# Patient Record
Sex: MALE | Race: WHITE
[De-identification: names, ages, dates, MRNs, and addresses within clinical notes are randomized per-mention and may not be internally consistent; named-entity substitution may affect disease eponyms.]

---

## 2019-09-06 ENCOUNTER — HOSPITAL ENCOUNTER (OUTPATIENT)
Dept: HOSPITAL 76 - SDS | Age: 34
Discharge: HOME | End: 2019-09-06
Attending: ORTHOPAEDIC SURGERY
Payer: COMMERCIAL

## 2019-09-06 VITALS — DIASTOLIC BLOOD PRESSURE: 92 MMHG | SYSTOLIC BLOOD PRESSURE: 103 MMHG

## 2019-09-06 DIAGNOSIS — S52.501A: Primary | ICD-10-CM

## 2019-09-06 PROCEDURE — 0PSH04Z REPOSITION RIGHT RADIUS WITH INTERNAL FIXATION DEVICE, OPEN APPROACH: ICD-10-PCS | Performed by: ORTHOPAEDIC SURGERY

## 2019-09-06 PROCEDURE — 73110 X-RAY EXAM OF WRIST: CPT

## 2019-09-06 PROCEDURE — 25607 OPTX DST RD XARTC FX/EPI SEP: CPT

## 2019-09-06 RX ADMIN — HYDROMORPHONE HYDROCHLORIDE ONE MG: 1 INJECTION, SOLUTION INTRAMUSCULAR; INTRAVENOUS; SUBCUTANEOUS at 15:58

## 2019-09-06 RX ADMIN — HYDROMORPHONE HYDROCHLORIDE ONE MG: 1 INJECTION, SOLUTION INTRAMUSCULAR; INTRAVENOUS; SUBCUTANEOUS at 16:08

## 2019-09-06 NOTE — OPERATIVE REPORT
Operative Report





- General


Procedure Date: 09/06/19


Planned Procedure: 





Right distal radius fracture open reduction internal fixation


Pre-Op Diagnosis: Right distal radius fracture


Procedure Performed: 





Right distal radius fracture open reduction internal fixation


Post Op Diagnosis: Right distal radius fracture





- Procedure Note


Primary Surgeon: ISAURA FAIRBANKS


Secondary Surgeon: ARIANNE CARLSON


Anesthesia Technique: General LMA


Estimated Blood Loss (mL): 50





- Other


Other Information/Narrative: 





Tourniquet Time: 


130 minutes at 250mmHg.





Specimen(s) Information: 


None





Implants: 


[employer] right regular DVR cross lock distal radius plate


2.7 mm locking screws x 8


2.7 mm non-locking cortical screws x 3





Complication(s): 


None





Condition: 


Stable to recovery





Indications for Surgery: 


The patient is a 34-year-old right-hand-dominant male who sustained injury to 

the right wrist while in a [place] off the [facility] on 27 August.  He was 

medevac'd from his ship to [facility], where radiographs 

demonstrated a comminuted, displaced distal radius fracture. He underwent closed

reduction and splinting by orthopedics and was then medevac'd back to his home 

port in [place], [place], and referred to orthopedics for evaluation.  Exam 

demonstrated some numbness and tingling in the thumb, which the patient reports 

has been stable since injury. Radiographs and CT scan obtained in [place] 

demonstrated a comminuted distal radius fracture, with a minimally displaced 

intra-articular split.  Treatment options including operative and nonoperative 

management were discussed, based on fracture parameters, open reduction and in

ternal fixation was recommended. Risks of surgery were discussed to include 

bleeding, infection, implant prominence, need for implant removal, postoperative

wrist stiffness, persistence of symptoms, damage to nerves, vessels, tendons, 

ligaments, bone and cartilage and anesthesia complications to include medication

side effects and allergic reactions and even death. After discussion, he wished 

to proceed.





Findings: 


Comminuted right distal radius fracture, with dorsal translation of the distal 

segments dorsal to volar cortex of the radial shaft.





Descriptions of Procedure:


The patient was met in the Preoperative Holding Area, at which time preoperative

paperwork was confirmed. The right arm splint and index finger were signed. The 

patient was then brought to Main Operating Room, placed supine on the Operating 

Room table and general anesthesia was induced.  The splint was removed, and the 

arm was washed with a pre-prep of Hibiclens followed by alcohol.  After ensuring

that the arm was completely dry, a nonsterile tourniquet was placed high on the 

operative arm.  The operative extremity was then prepped and draped over a hand 

table in the normal sterile fashion.  Following this, a surgical timeout was 

conducted to confirm correct patient, correct extremity and correct procedure 

and also to confirm presence and sterility of all required equipment and to 

confirm that antibiotics had been administered in the form of 2g of intravenous 

Ancef.  





The operative extremity was then exsanguinated with an Esmarch bandage and 

tourniquet inflated to 250mmHg. A approximately 8 cm longitudinal incision with 

distal zig-zag across the wrist crease was made over the volar wrist over the 

FCR tendon (the distal part of the Lopez approach to the forearm) using a 15 

blade.  Subcutaneous tissues were dissected carefully until the tendon of the 

FCR was identified.  The tendon sheath was sharply opened, and this was carried 

distally and proximally the full length of the incision.  The FCR tendon was 

retracted ulnarly, and the floor of the sheath was sharply incised revealing the

FPL.  Blunt dissection was used to free up the FPL from the underlying pronator 

quadratus, and the FPL was retracted ulnarly.  A [place] elevator was used to 

define the location of the fracture and wrist joint, and then the pronator 

quadratus was sharply incised in an L-shaped fashion distally and radially.  A k

ey elevator was used to elevate the pronator quadratus off the volar surface of 

the radius and a radial to ulnar direction.  The 15 blade was used to release 

the radial septum and brachioradialis insertion from the radial styloid.  The 

fracture line was identified and cleaned of soft tissue and hematoma using a 

combination of rongeurs, dental fix, irrigation and suction.  C-arm fluoroscopy 

was used to assess reduction, and it was felt to be inadequate.  Wrist extension

and an intra-focal freer elevator was used to open the fracture site further, 

allowing for the removal of fracture hematoma and small cancellous fragments.  

The proximal portion of the radius was then pronated to further expose the 

fracture site, and repeat debridement was performed.  The [place] elevator was 

then used to shoehorn the distal fragment back to the volar cortex and the 

fracture fragments were manipulated based on cortical keys.  Fluoroscopy was 

used to confirm reduction, and the reduction was provisionally held using a K 

wire placed through the radial styloid across the fracture.  A right regular DVR

cross lock plate was selected, and positioned under fluoroscopy.  Once satisfied

with the position, it was provisionally held with a K wire.  Upon examination of

the lateral images, the volar tilt was noted to be approximately neutral, and so

the decision was made to fix the plate distally first, in kickstand fashion, to 

allow improved restoration of volar tilt with the fixed angle construct.  The K 

wire through the plate was removed, and a Bovie scratch pad was cut and folded 

in half and placed underneath the plate proximally.  The position of the plate 

was again confirmed under fluoroscopy, and the plate was again pinned in place 

using K wires.  Biplanar fluoroscopy confirmed satisfactory position of the 

plate, and distal fixation was initiated.  The distal ulnar screw was then 

drilled, and an appropriately sized nonlocking screw was placed to help reduce 

the plate to the bone. After confirming appropriate bone plate apposition and 

screw placement on fluoroscopy, the remainder of the distal row screws were 

placed.  The K wire was removed from the radial styloid.  The Bovie scratch pad 

was removed from underneath the plate, and the proximal plate was reduced to the

radial shaft.  Position was again confirmed using fluoroscopy, and the proximal 

plate was secured to the radial shaft using 3 nonlocking screws.  The remaining 

distal screws were placed.  The wound was irrigated, and fluoroscopy was used to

confirm appropriate reduction and screw trajectory.  All distal screws appeared 

to be subchondral, and without violation of the DRUJ.  The tourniquet was let 

down, and manual pressure was held on the wound for approximately 5 minutes.  

The wound was then irrigated, and bleeding points were cauterized using bipolar 

electrocautery.  The DRUJ was examined and felt to have good stability.  The 

wound was irrigated one final time and closure was begun.





The deep dermal layer was closed with 2-0 Vicryl in buried interrupted fashion. 

The skin was then closed with 4-0 nylon in interrupted horizontal mattress 

fashion.  30 mL of half percent plain Marcaine were injected around the i

ncision.  The wound was dressed with xeroform, plain 4x4 gauze, followed by 

webril. A volar plaster wrist splint was applied followed by a gently 

compressive Ace bandage.  The patient was then awakened from general anesthesia 

without complication, brought to the Post Anesthesia Care for further recovery. 

 





Postoperative Plan:


1. The patient will be discharged from the Same Day Surgery Unit when discharge 

criteria are met.


2. The patient will remain in a splint for approximately 10 days until follow-

up, this will be removed in clinic to facilitate early range of motion.


3. Expect return to full duty in 12-16 weeks, and he was counseled that he may 

have wrist stiffness up postoperatively.

## 2019-09-06 NOTE — ANESTHESIA
Pre-Anesthesia VS, & Labs





- Diagnosis





left distal radius fracture





- Procedure





Left distal radius open reduction internal fixation


Vital Signs: 





                                        











Temp Pulse Resp BP Pulse Ox


 


 36.6 C   60   12   142/86 H  99 


 


 09/06/19 10:10  09/06/19 10:10  09/06/19 10:10  09/06/19 10:10  09/06/19 10:10














                                        





Height                           5 ft 11 in


Weight (kg)                      88.45 kg











- NPO


>8 hours





Home Medications and Allergies


Allergies/Adverse Reactions: 


                                    Allergies











Allergy/AdvReac Type Severity Reaction Status Date / Time


 


Sulfa (Sulfonamide Allergy  Unknown Verified 09/05/19 10:14





Antibiotics)     














Anes History & Medical History





- Anesthetic History


Anesthesia Complications: reports: No previous complications





- Medical History


Cardiovascular: reports: None


Gastrointestinal: reports: None


Urinary: reports: None


Musculoskeletal: reports: Other


Endocrine/Autoimmune: reports: None


Skin: reports: None





- Surgical History


Eyes Ears Nose Throat (EENT): Tonsil/Adenoidectomy





Exam


General: Alert, Oriented x3


Dental: WNL


Mouth Opening: Greater than 4 Fingerbreadths


Neck Mobility: Normal


Mallampati classification: I


Thyromental Distance: greater than 6 cm


Respiratory: Lungs clear


Cardiovascular: Regular rate, Normal S1, Normal S2





Plan


Anesthesia Type: General


Consent for Procedure(s) Verified and Reviewed: Yes


Code Status: Attempt Resuscitation


ASA classification: 1-Healthy patient


Is this case an emergency?: No

## 2019-09-09 NOTE — XRAY REPORT
Reason:  ORIF LEFT WRIST

Procedure Date:  09/06/2019   

Accession Number:  [id number] / [id number]                    

Procedure:  FL  - OR C-Arm Procedure CPT Code:  

 

FULL RESULT:

 

 

EXAM:

FLUOROSCOPIC GUIDANCE

 

EXAM DATE: 9/6/2019 03:55 PM.

 

CLINICAL HISTORY: ORIF LEFT WRIST.

 

COMPARISON: None.

 

FINDINGS: No images saved for the study.

IMPRESSION: Fluoroscopic guidance provided for wrist surgery. Total 

fluoroscopy time: 43 seconds.  Number of images: 0.

 

[facility]

## 2019-09-09 NOTE — XRAY REPORT
Reason:  ORIF LEFT WRIST

Procedure Date:  09/06/2019   

Accession Number:  [id number] / [id number]                    

Procedure:  XR  - Wrist 3 View LT CPT Code:  

 

FULL RESULT:

 

 

EXAM:

FLUOROSCOPIC GUIDANCE

 

EXAM DATE: 9/6/2019 03:58 PM.

 

CLINICAL HISTORY: ORIF LEFT WRIST.

 

COMPARISON:  09/06/2019 10:22 AM.

 

FINDINGS

IMPRESSION: Fluoroscopic guidance provided for ORIF of the left wrist. 

Total fluoroscopy time: 0.43 minutes.  Number of images: 5.

 

[facility]